# Patient Record
Sex: FEMALE | Race: BLACK OR AFRICAN AMERICAN | NOT HISPANIC OR LATINO | URBAN - METROPOLITAN AREA
[De-identification: names, ages, dates, MRNs, and addresses within clinical notes are randomized per-mention and may not be internally consistent; named-entity substitution may affect disease eponyms.]

---

## 2022-10-27 ENCOUNTER — APPOINTMENT (RX ONLY)
Dept: URBAN - METROPOLITAN AREA CLINIC 66 | Facility: CLINIC | Age: 32
Setting detail: DERMATOLOGY
End: 2022-10-27

## 2022-10-27 DIAGNOSIS — L30.0 NUMMULAR DERMATITIS: ICD-10-CM | Status: INADEQUATELY CONTROLLED

## 2022-10-27 DIAGNOSIS — B35.4 TINEA CORPORIS: ICD-10-CM | Status: INADEQUATELY CONTROLLED

## 2022-10-27 PROBLEM — L08.9 LOCAL INFECTION OF THE SKIN AND SUBCUTANEOUS TISSUE, UNSPECIFIED: Status: ACTIVE | Noted: 2022-10-27

## 2022-10-27 PROBLEM — L30.9 DERMATITIS, UNSPECIFIED: Status: ACTIVE | Noted: 2022-10-27

## 2022-10-27 PROCEDURE — ? COUNSELING

## 2022-10-27 PROCEDURE — 99204 OFFICE O/P NEW MOD 45 MIN: CPT

## 2022-10-27 PROCEDURE — ? PRESCRIPTION

## 2022-10-27 PROCEDURE — ? PRESCRIPTION MEDICATION MANAGEMENT

## 2022-10-27 PROCEDURE — ? MEDICATION COUNSELING

## 2022-10-27 PROCEDURE — ? FULL BODY SKIN EXAM - DECLINED

## 2022-10-27 RX ORDER — ECONAZOLE NITRATE 10 MG/G
CREAM TOPICAL BID
Qty: 30 | Refills: 5 | Status: ERX | COMMUNITY
Start: 2022-10-27

## 2022-10-27 RX ORDER — HYDROCORTISONE 25 MG/G
CREAM TOPICAL
Qty: 30 | Refills: 8 | Status: ERX | COMMUNITY
Start: 2022-10-27

## 2022-10-27 RX ORDER — CLOBETASOL PROPIONATE 0.5 MG/G
CREAM TOPICAL BID
Qty: 60 | Refills: 8 | Status: ERX | COMMUNITY
Start: 2022-10-27

## 2022-10-27 RX ADMIN — CLOBETASOL PROPIONATE: 0.5 CREAM TOPICAL at 00:00

## 2022-10-27 RX ADMIN — HYDROCORTISONE: 25 CREAM TOPICAL at 00:00

## 2022-10-27 RX ADMIN — ECONAZOLE NITRATE: 10 CREAM TOPICAL at 00:00

## 2022-10-27 ASSESSMENT — LOCATION SIMPLE DESCRIPTION DERM
LOCATION SIMPLE: ABDOMEN
LOCATION SIMPLE: LEFT CHEEK
LOCATION SIMPLE: CHEST
LOCATION SIMPLE: LEFT BREAST

## 2022-10-27 ASSESSMENT — LOCATION ZONE DERM
LOCATION ZONE: TRUNK
LOCATION ZONE: FACE

## 2022-10-27 ASSESSMENT — LOCATION DETAILED DESCRIPTION DERM
LOCATION DETAILED: LEFT SUPERIOR LATERAL MALAR CHEEK
LOCATION DETAILED: LEFT LATERAL BREAST 4-5:00 REGION
LOCATION DETAILED: PERIUMBILICAL SKIN
LOCATION DETAILED: MIDDLE STERNUM

## 2022-10-27 NOTE — PROCEDURE: MEDICATION COUNSELING
EMERGENCY DEPARTMENT HISTORY AND PHYSICAL EXAM    12:58 PM      Date: 7/20/2018  Patient Name: Kenneth Beltran    History of Presenting Illness     Chief Complaint   Patient presents with    Sore Throat         History Provided By: Patient    Chief Complaint: Sore Throat  Duration:  Days  Timing:  Worsening, Acute on Chronic Flare-Up  Location: Throat  Quality: Burning, Sore  Severity: 9 out of 10  Modifying Factors: No improvement with previously prescribed steroids. Associated Symptoms: nasal congestion, decreased appetite      Additional History (Context): Kenneth Beltran is a 47 y.o. male with a history of anemia and asthma, who presents to the ED with complaint of sore throat and swelling tonsils onset 1 week. Patient describes his sore throat as a burning sensation with 9/10 pain intensity. He notes history of similar symptoms in the past, but states that this flare-up is worse than normal. He was recently seen by his PCP for similar symptoms and was prescribed steroids, but patient denies improvement in his sore throat. He states that he is supposed to follow up with ENT, but has not been given a referral. Patient reports recent decreased appetite and nasal congestion, but denies associated fever, chills, shortness of breath, difficulty swallowing, or feeling like his throat is closing. Patient has a PCP appointment scheduled for 4 PM today, but notes that his pain worsened this morning. He makes no further complaints. PCP: Bishop Galicia, DO    Current Outpatient Prescriptions   Medication Sig Dispense Refill    HYDROcodone-acetaminophen (NORCO) 5-325 mg per tablet Take 1 Tab by mouth every six (6) hours as needed for Pain. Max Daily Amount: 4 Tabs. 5 Tab 0    cyclobenzaprine (FLEXERIL) 10 mg tablet Take 10 mg by mouth daily as needed for Muscle Spasm(s).  fluticasone-salmeterol (ADVAIR DISKUS) 500-50 mcg/dose diskus inhaler Take 1 Puff by inhalation every twelve (12) hours.  14 Inhaler 0    Inhalational Spacing Device (AEROCHAMBER MV) 1 Each by Does Not Apply route as needed. 1 Device 0    Cetirizine (ZYRTEC) 10 mg Cap Take  by mouth.  multivitamins-minerals-lutein (CENTRUM SILVER) Tab Take  by mouth.  ascorbic acid (VITAMIN C) 500 mg tablet Take  by mouth. Past History     Past Medical History:  Past Medical History:   Diagnosis Date    Anemia     Asthma     Asthma     Groin swelling        Past Surgical History:  History reviewed. No pertinent surgical history. Family History:  History reviewed. No pertinent family history. Social History:  Social History   Substance Use Topics    Smoking status: Never Smoker    Smokeless tobacco: Never Used    Alcohol use No       Allergies:  No Known Allergies      Review of Systems       Review of Systems   Constitutional: Positive for appetite change (decreased). Negative for fever. HENT: Positive for congestion (nasal) and sore throat. Negative for facial swelling, postnasal drip, rhinorrhea, trouble swallowing and voice change. Eyes: Negative for visual disturbance. Respiratory: Negative for shortness of breath. Cardiovascular: Negative for chest pain. Gastrointestinal: Negative for abdominal pain and diarrhea. Genitourinary: Negative for dysuria. Musculoskeletal: Negative for neck pain. Skin: Negative for rash. Psychiatric/Behavioral: Negative for confusion. All other systems reviewed and are negative. Physical Exam     Visit Vitals    BP (!) 156/105 (BP 1 Location: Left arm, BP Patient Position: Sitting)    Pulse 71    Temp 97.8 °F (36.6 °C)    Resp 20    SpO2 96%         Physical Exam   Constitutional: He appears well-developed and well-nourished. No distress. HENT:   Head: Normocephalic and atraumatic.    Right Ear: Tympanic membrane, external ear and ear canal normal.   Left Ear: Tympanic membrane, external ear and ear canal normal.   Nose: Nose normal. Right sinus exhibits no maxillary sinus tenderness and no frontal sinus tenderness. Left sinus exhibits no maxillary sinus tenderness and no frontal sinus tenderness. Mouth/Throat: Uvula is midline, oropharynx is clear and moist and mucous membranes are normal. No trismus in the jaw. No uvula swelling. No oropharyngeal exudate, posterior oropharyngeal edema, posterior oropharyngeal erythema or tonsillar abscesses. Mild tonsillar enlargement without exudate or erythema. Airway patent. Uvula midline. Eyes: Conjunctivae are normal.   Neck: Normal range of motion. Neck supple. Cardiovascular: Normal rate, regular rhythm and normal heart sounds. Pulmonary/Chest: Effort normal and breath sounds normal.   Abdominal: Soft. There is no tenderness. Musculoskeletal: Normal range of motion. Neurological: He is alert. Skin: Skin is warm and dry. He is not diaphoretic. Psychiatric: He has a normal mood and affect. Nursing note and vitals reviewed. Diagnostic Study Results     Labs -  Recent Results (from the past 12 hour(s))   STREP THROAT SCREEN    Collection Time: 07/20/18  1:00 PM   Result Value Ref Range    Special Requests: NO SPECIAL REQUESTS      Strep Screen NEGATIVE       Culture result: PENDING        Radiologic Studies -   No orders to display         Medical Decision Making   I am the first provider for this patient. I reviewed the vital signs, available nursing notes, past medical history, past surgical history, family history and social history. Vital Signs-Reviewed the patient's vital signs. Records Reviewed: Nursing Notes (Time of Review: 12:58 PM)    ED Course: Progress Notes, Reevaluation, and Consults:  Reviewed lab results with patient. Ready for discharge. 1:39 PM     Provider Notes (Medical Decision Making): MDM  Number of Diagnoses or Management Options  Acute tonsillitis, unspecified etiology:   Elevated blood pressure reading:   Diagnosis management comments: Strep negative.   Airway patent. Mild tonsillar inflammation- treated with steroids. Referred to ENT since this is recurrent issue. Also being managed by PCP, has appt later today. Diagnosis     Clinical Impression:   1. Acute tonsillitis, unspecified etiology    2. Elevated blood pressure reading        Disposition: Discharge    Follow-up Information     Follow up With Details Comments Nelly Trujillo MD Schedule an appointment as soon as possible for a visit  [address] [place] [id number]  [phone]      [facility] EMERGENCY DEPT  Immediately if symptoms worsen [address] [place] [id number]  [phone]           Patient's Medications   Start Taking    HYDROCODONE-ACETAMINOPHEN (NORCO) 5-325 MG PER TABLET    Take 1 Tab by mouth every six (6) hours as needed for Pain. Max Daily Amount: 4 Tabs. Continue Taking    ASCORBIC ACID (VITAMIN C) 500 MG TABLET    Take  by mouth. CETIRIZINE (ZYRTEC) 10 MG CAP    Take  by mouth. CYCLOBENZAPRINE (FLEXERIL) 10 MG TABLET    Take 10 mg by mouth daily as needed for Muscle Spasm(s). FLUTICASONE-SALMETEROL (ADVAIR DISKUS) 500-50 MCG/DOSE DISKUS INHALER    Take 1 Puff by inhalation every twelve (12) hours. INHALATIONAL SPACING DEVICE (AEROCHAMBER MV)    1 Each by Does Not Apply route as needed. MULTIVITAMINS-MINERALS-LUTEIN (CENTRUM SILVER) TAB    Take  by mouth. These Medications have changed    No medications on file   Stop Taking    No medications on file     _______________________________    Attestations:  Kurtis Roberts. acting as a scribe for and in the presence of KAYLA Odonnell      July 20, 2018 at 12:58 PM       Provider Attestation:      I personally performed the services described in the documentation, reviewed the documentation, as recorded by the scribe in my presence, and it accurately and completely records my words and actions.  July 20, 2018 at 12:58 PM - Belle PA-C  _______________________________ Azathioprine Counseling:  I discussed with the patient the risks of azathioprine including but not limited to myelosuppression, immunosuppression, hepatotoxicity, lymphoma, and infections.  The patient understands that monitoring is required including baseline LFTs, Creatinine, possible TPMP genotyping and weekly CBCs for the first month and then every 2 weeks thereafter.  The patient verbalized understanding of the proper use and possible adverse effects of azathioprine.  All of the patient's questions and concerns were addressed.

## 2022-10-27 NOTE — PROCEDURE: PRESCRIPTION MEDICATION MANAGEMENT
Initiate Treatment: Econazole 1% cream BID to rash on face until resolved
Detail Level: Zone
Plan: Discussed ddx and treatment regimen in detail. Advised pt that steroids can exacerbate a fungal rash and Bassem avoid applying steroids to rash for at least 2 weeks. Recommended pt f/u if rash is recalcitrant to therapies. Pt agreeable to plan.
Render In Strict Bullet Format?: No
Plan: Discussed ddx and regimen in detail. Recommended f/u if rash is recalcitrant to topical therapies.  Pt agreeable to plan.
Initiate Treatment: Clobetasol 0.05% cream BID for up to 2 weeks PRN rash on the body \\nHydrocortisone 2.5% cream BID  up to 7 days PRN rash on the face

## 2022-10-27 NOTE — HPI: RASH
How Severe Is Your Rash?: mild
Is This A New Presentation, Or A Follow-Up?: Rash
Additional History: Pt reports hx of eczema. She uses dove baby and sensitive lotions, Eucerin eczema lotion, and CeraVe moisturizer cream. Pt feels rash was triggered my night sweats.

## 2022-10-27 NOTE — PROCEDURE: MEDICATION COUNSELING
Xeldeloresz Pregnancy And Lactation Text: This medication is Pregnancy Category D and is not considered safe during pregnancy.  The risk during breast feeding is also uncertain.

## 2024-06-12 NOTE — PROCEDURE: MEDICATION COUNSELING
GA: 33w0d            DOUGIE: 7/31/2024     GDMA1     Self Glucose Testing           Date    Fast    Brkfst    Lunch    Post    Supper    Post   6/6 87 117  88  109   7 90 115  112  98   8 89 104  115  109   9 90 111  91  105   10 90 85  101  111   11 90 118  95  119   12 80              Current Medication Regimen:   Diet controlled     New Medication Regimen:  Diet controlled         Tremfya Counseling: I discussed with the patient the risks of guselkumab including but not limited to immunosuppression, serious infections, and drug reactions.  The patient understands that monitoring is required including a PPD at baseline and must alert us or the primary physician if symptoms of infection or other concerning signs are noted.

## 2024-09-18 NOTE — PROCEDURE: MEDICATION COUNSELING
170.2 Hydroquinone Counseling:  Patient advised that medication may result in skin irritation, lightening (hypopigmentation), dryness, and burning.  In the event of skin irritation, the patient was advised to reduce the amount of the drug applied or use it less frequently.  Rarely, spots that are treated with hydroquinone can become darker (pseudoochronosis).  Should this occur, patient instructed to stop medication and call the office. The patient verbalized understanding of the proper use and possible adverse effects of hydroquinone.  All of the patient's questions and concerns were addressed.